# Patient Record
Sex: FEMALE | Race: WHITE | NOT HISPANIC OR LATINO | Employment: UNEMPLOYED | URBAN - METROPOLITAN AREA
[De-identification: names, ages, dates, MRNs, and addresses within clinical notes are randomized per-mention and may not be internally consistent; named-entity substitution may affect disease eponyms.]

---

## 2023-11-08 ENCOUNTER — TELEPHONE (OUTPATIENT)
Dept: PRIMARY CARE | Facility: CLINIC | Age: 53
End: 2023-11-08
Payer: MEDICARE

## 2023-11-08 DIAGNOSIS — E11.9 TYPE 2 DIABETES MELLITUS WITHOUT COMPLICATION, UNSPECIFIED WHETHER LONG TERM INSULIN USE (MULTI): ICD-10-CM

## 2023-11-08 RX ORDER — FAMOTIDINE 20 MG/1
20 TABLET, FILM COATED ORAL 2 TIMES DAILY
Qty: 180 TABLET | Refills: 0 | Status: SHIPPED | OUTPATIENT
Start: 2023-11-08 | End: 2024-01-30

## 2023-11-08 RX ORDER — FAMOTIDINE 20 MG/1
20 TABLET, FILM COATED ORAL 2 TIMES DAILY
COMMUNITY
End: 2023-11-08 | Stop reason: SDUPTHER

## 2023-11-10 PROBLEM — E87.1 HYPONATREMIA: Status: ACTIVE | Noted: 2022-06-07

## 2023-11-10 PROBLEM — F41.9 ANXIETY: Status: ACTIVE | Noted: 2023-11-10

## 2023-11-10 PROBLEM — M54.9 BACKACHE: Status: ACTIVE | Noted: 2023-11-10

## 2023-11-10 PROBLEM — D72.10 EOSINOPHILIA: Status: ACTIVE | Noted: 2022-06-07

## 2023-11-10 PROBLEM — R19.7 DIARRHEA: Status: ACTIVE | Noted: 2021-05-18

## 2023-11-10 PROBLEM — F10.11 HISTORY OF ALCOHOL ABUSE: Status: ACTIVE | Noted: 2023-11-10

## 2023-11-10 PROBLEM — E11.9 TYPE 2 DIABETES MELLITUS (MULTI): Status: ACTIVE | Noted: 2018-07-24

## 2023-11-10 PROBLEM — I10 BENIGN ESSENTIAL HYPERTENSION: Status: ACTIVE | Noted: 2023-11-10

## 2023-11-10 PROBLEM — S22.39XA CLOSED FRACTURE OF ONE RIB: Status: ACTIVE | Noted: 2023-11-10

## 2023-11-10 PROBLEM — M79.7 FIBROMYALGIA: Status: ACTIVE | Noted: 2023-11-10

## 2023-11-10 PROBLEM — L25.9: Status: ACTIVE | Noted: 2019-02-21
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
## 2023-11-10 PROBLEM — S72.009A FRACTURE OF NECK OF FEMUR (MULTI): Status: ACTIVE | Noted: 2023-11-10

## 2023-11-10 PROBLEM — J02.9 ACUTE PHARYNGITIS: Status: ACTIVE | Noted: 2019-04-01

## 2023-11-10 PROBLEM — S73.006A CLOSED TRAUMATIC DISLOCATION OF HIP (MULTI): Status: ACTIVE | Noted: 2023-11-10

## 2023-11-10 PROBLEM — M25.511 RIGHT SHOULDER PAIN: Status: ACTIVE | Noted: 2018-07-24

## 2023-11-10 PROBLEM — R22.2 SOFT TISSUE SWELLING OF CHEST WALL: Status: ACTIVE | Noted: 2022-04-28

## 2023-11-10 PROBLEM — R91.1 NODULE OF LEFT LUNG: Status: ACTIVE | Noted: 2021-05-18

## 2023-11-10 PROBLEM — R07.81 RIB PAIN ON RIGHT SIDE: Status: ACTIVE | Noted: 2019-02-06

## 2023-11-10 PROBLEM — K21.9 GASTROESOPHAGEAL REFLUX DISEASE: Status: ACTIVE | Noted: 2021-03-05

## 2023-11-10 PROBLEM — M54.6 THORACIC BACK PAIN: Status: ACTIVE | Noted: 2022-04-15

## 2023-11-10 PROBLEM — M25.50 ARTHRALGIA OF MULTIPLE SITES, BILATERAL: Status: ACTIVE | Noted: 2019-10-30

## 2023-11-10 PROBLEM — F10.20: Status: ACTIVE | Noted: 2022-04-19

## 2023-11-10 PROBLEM — E61.1 IRON DEFICIENCY: Status: ACTIVE | Noted: 2022-11-01

## 2023-11-10 PROBLEM — S22.49XA RIB FRACTURES: Status: ACTIVE | Noted: 2021-05-18

## 2023-11-10 PROBLEM — R23.3 SPONTANEOUS BRUISING: Status: ACTIVE | Noted: 2022-04-15

## 2023-11-10 PROBLEM — R93.89 ABNORMAL CHEST XRAY: Status: ACTIVE | Noted: 2022-06-27

## 2023-11-10 PROBLEM — I10 HYPERTENSION: Status: ACTIVE | Noted: 2018-07-24

## 2023-11-10 PROBLEM — M19.90 CHRONIC OSTEOARTHRITIS: Status: ACTIVE | Noted: 2023-11-10

## 2023-11-10 PROBLEM — A08.8: Status: ACTIVE | Noted: 2021-04-27

## 2023-11-10 PROBLEM — F31.81 BIPOLAR II DISORDER (MULTI): Status: ACTIVE | Noted: 2023-11-10

## 2023-11-10 PROBLEM — J94.2 HEMOTHORAX: Status: ACTIVE | Noted: 2022-06-07

## 2023-11-10 PROBLEM — R58 ECCHYMOSIS: Status: ACTIVE | Noted: 2023-11-10

## 2023-11-10 PROBLEM — E78.49 OTHER HYPERLIPIDEMIA: Status: ACTIVE | Noted: 2018-07-24

## 2023-11-10 PROBLEM — R07.89 CHEST WALL PAIN: Status: ACTIVE | Noted: 2023-11-10

## 2023-11-10 PROBLEM — R05.9 COUGH: Status: ACTIVE | Noted: 2019-02-06
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
## 2023-11-10 PROBLEM — D64.89: Status: ACTIVE | Noted: 2022-04-19

## 2023-11-10 PROBLEM — F32.A DEPRESSION: Status: ACTIVE | Noted: 2023-11-10

## 2023-11-10 PROBLEM — R10.33 PERIUMBILICAL ABDOMINAL PAIN: Status: ACTIVE | Noted: 2021-04-27

## 2023-11-10 PROBLEM — D50.9 IRON DEFICIENCY ANEMIA: Status: ACTIVE | Noted: 2022-04-20

## 2023-11-10 PROBLEM — J44.9 COPD MIXED TYPE (MULTI): Status: ACTIVE | Noted: 2022-07-12

## 2023-11-10 PROBLEM — F17.200 TOBACCO DEPENDENCE SYNDROME: Status: ACTIVE | Noted: 2023-11-10

## 2023-11-10 RX ORDER — GABAPENTIN 800 MG/1
800 TABLET ORAL 4 TIMES DAILY
COMMUNITY
Start: 2017-04-25 | End: 2023-12-06 | Stop reason: SDDI

## 2023-11-10 RX ORDER — ACETAMINOPHEN, DIPHENHYDRAMINE HCL, PHENYLEPHRINE HCL 325; 25; 5 MG/1; MG/1; MG/1
TABLET ORAL
COMMUNITY
Start: 2023-08-07

## 2023-11-10 RX ORDER — GLIMEPIRIDE 2 MG/1
TABLET ORAL
COMMUNITY
Start: 2017-04-25 | End: 2023-12-06 | Stop reason: SDDI

## 2023-11-10 RX ORDER — MIRTAZAPINE 30 MG/1
TABLET, FILM COATED ORAL
COMMUNITY
Start: 2015-02-11 | End: 2023-12-06 | Stop reason: SDDI

## 2023-11-10 RX ORDER — PRAVASTATIN SODIUM 20 MG/1
20 TABLET ORAL DAILY
COMMUNITY
End: 2023-12-06 | Stop reason: SDDI

## 2023-11-10 RX ORDER — MELOXICAM 15 MG/1
15 TABLET ORAL DAILY
COMMUNITY
Start: 2017-04-25 | End: 2023-12-06 | Stop reason: SDDI

## 2023-11-10 RX ORDER — ATENOLOL 25 MG/1
25 TABLET ORAL DAILY
COMMUNITY
End: 2023-12-06 | Stop reason: SDUPTHER

## 2023-11-10 RX ORDER — ASCORBIC ACID 500 MG
500 TABLET ORAL DAILY
COMMUNITY
End: 2023-12-06 | Stop reason: SDDI

## 2023-11-10 RX ORDER — OMEPRAZOLE 20 MG/1
CAPSULE, DELAYED RELEASE ORAL
COMMUNITY
Start: 2015-01-02 | End: 2023-12-06 | Stop reason: SDDI

## 2023-11-10 RX ORDER — VENLAFAXINE HYDROCHLORIDE 75 MG/1
CAPSULE, EXTENDED RELEASE ORAL
COMMUNITY

## 2023-11-10 RX ORDER — FERROUS SULFATE 325(65) MG
325 TABLET ORAL EVERY 24 HOURS
COMMUNITY

## 2023-11-10 RX ORDER — LORAZEPAM 1 MG/1
TABLET ORAL
COMMUNITY
Start: 2015-09-22 | End: 2023-12-06 | Stop reason: SDDI

## 2023-11-10 RX ORDER — ROSUVASTATIN CALCIUM 10 MG/1
TABLET, COATED ORAL
COMMUNITY
End: 2023-11-13 | Stop reason: WASHOUT

## 2023-11-10 RX ORDER — QUINAPRIL 20 MG/1
20 TABLET ORAL DAILY
COMMUNITY
Start: 2023-02-28 | End: 2023-12-06 | Stop reason: SDDI

## 2023-11-10 RX ORDER — QUINAPRIL HYDROCHLORIDE AND HYDROCHLOROTHIAZIDE 20; 12.5 MG/1; MG/1
TABLET, FILM COATED ORAL
COMMUNITY
Start: 2015-02-03 | End: 2023-12-06 | Stop reason: SDDI

## 2023-11-10 RX ORDER — RISPERIDONE 3 MG/1
3 TABLET ORAL NIGHTLY
COMMUNITY

## 2023-11-10 RX ORDER — LURASIDONE HYDROCHLORIDE 80 MG/1
TABLET, FILM COATED ORAL
COMMUNITY
Start: 2015-01-12 | End: 2023-12-06 | Stop reason: SDDI

## 2023-11-10 RX ORDER — TOPIRAMATE 50 MG/1
50 TABLET, FILM COATED ORAL 2 TIMES DAILY
COMMUNITY
Start: 2023-09-12

## 2023-11-10 RX ORDER — METFORMIN HYDROCHLORIDE 500 MG/1
TABLET ORAL
COMMUNITY
Start: 2017-04-25 | End: 2023-11-13 | Stop reason: WASHOUT

## 2023-11-10 RX ORDER — LANSOPRAZOLE 30 MG/1
30 CAPSULE, DELAYED RELEASE ORAL EVERY 24 HOURS
COMMUNITY
End: 2023-12-06 | Stop reason: SDDI

## 2023-11-10 RX ORDER — ESTRADIOL 1 MG/1
TABLET ORAL
COMMUNITY
Start: 2015-01-12 | End: 2023-12-06 | Stop reason: SDDI

## 2023-11-10 RX ORDER — TIZANIDINE 4 MG/1
1 TABLET ORAL 3 TIMES DAILY PRN
COMMUNITY
Start: 2017-04-25 | End: 2023-12-06 | Stop reason: SDDI

## 2023-11-13 ENCOUNTER — LAB (OUTPATIENT)
Dept: LAB | Facility: LAB | Age: 53
End: 2023-11-13
Payer: MEDICARE

## 2023-11-13 ENCOUNTER — OFFICE VISIT (OUTPATIENT)
Dept: PRIMARY CARE | Facility: CLINIC | Age: 53
End: 2023-11-13
Payer: MEDICARE

## 2023-11-13 VITALS
WEIGHT: 219 LBS | SYSTOLIC BLOOD PRESSURE: 124 MMHG | BODY MASS INDEX: 31.88 KG/M2 | DIASTOLIC BLOOD PRESSURE: 80 MMHG | HEART RATE: 90 BPM | OXYGEN SATURATION: 94 %

## 2023-11-13 DIAGNOSIS — E11.9 TYPE 2 DIABETES MELLITUS WITHOUT COMPLICATION, WITHOUT LONG-TERM CURRENT USE OF INSULIN (MULTI): Primary | ICD-10-CM

## 2023-11-13 DIAGNOSIS — Z23 NEEDS FLU SHOT: ICD-10-CM

## 2023-11-13 DIAGNOSIS — I10 BENIGN ESSENTIAL HYPERTENSION: ICD-10-CM

## 2023-11-13 DIAGNOSIS — F17.200 TOBACCO DEPENDENCE SYNDROME: ICD-10-CM

## 2023-11-13 DIAGNOSIS — F10.11 HISTORY OF ALCOHOL ABUSE: ICD-10-CM

## 2023-11-13 DIAGNOSIS — E11.9 TYPE 2 DIABETES MELLITUS WITHOUT COMPLICATION, UNSPECIFIED WHETHER LONG TERM INSULIN USE (MULTI): ICD-10-CM

## 2023-11-13 LAB
ALBUMIN SERPL-MCNC: 4.1 G/DL (ref 3.5–5)
ALP BLD-CCNC: 77 U/L (ref 35–125)
ALT SERPL-CCNC: 10 U/L (ref 5–40)
ANION GAP SERPL CALC-SCNC: 14 MMOL/L
APPEARANCE UR: CLEAR
AST SERPL-CCNC: 12 U/L (ref 5–40)
BILIRUB SERPL-MCNC: <0.2 MG/DL (ref 0.1–1.2)
BILIRUB UR STRIP.AUTO-MCNC: NEGATIVE MG/DL
BUN SERPL-MCNC: 15 MG/DL (ref 8–25)
CALCIUM SERPL-MCNC: 8.9 MG/DL (ref 8.5–10.4)
CHLORIDE SERPL-SCNC: 104 MMOL/L (ref 97–107)
CO2 SERPL-SCNC: 20 MMOL/L (ref 24–31)
COLOR UR: COLORLESS
CREAT SERPL-MCNC: 1.2 MG/DL (ref 0.4–1.6)
CREAT UR-MCNC: 66.8 MG/DL
EST. AVERAGE GLUCOSE BLD GHB EST-MCNC: 146 MG/DL
GFR SERPL CREATININE-BSD FRML MDRD: 54 ML/MIN/1.73M*2
GLUCOSE SERPL-MCNC: 241 MG/DL (ref 65–99)
GLUCOSE UR STRIP.AUTO-MCNC: NORMAL MG/DL
HBA1C MFR BLD: 6.7 %
KETONES UR STRIP.AUTO-MCNC: NEGATIVE MG/DL
LEUKOCYTE ESTERASE UR QL STRIP.AUTO: NEGATIVE
MICROALBUMIN UR-MCNC: <12 MG/L (ref 0–23)
MICROALBUMIN/CREAT UR: NORMAL MG/G{CREAT}
NITRITE UR QL STRIP.AUTO: NEGATIVE
PH UR STRIP.AUTO: 6.5 [PH]
POTASSIUM SERPL-SCNC: 4.3 MMOL/L (ref 3.4–5.1)
PROT SERPL-MCNC: 6.4 G/DL (ref 5.9–7.9)
PROT UR STRIP.AUTO-MCNC: NEGATIVE MG/DL
RBC # UR STRIP.AUTO: NEGATIVE /UL
SODIUM SERPL-SCNC: 138 MMOL/L (ref 133–145)
SP GR UR STRIP.AUTO: 1.01
UROBILINOGEN UR STRIP.AUTO-MCNC: NORMAL MG/DL

## 2023-11-13 PROCEDURE — 82043 UR ALBUMIN QUANTITATIVE: CPT

## 2023-11-13 PROCEDURE — 4010F ACE/ARB THERAPY RXD/TAKEN: CPT | Performed by: NURSE PRACTITIONER

## 2023-11-13 PROCEDURE — 3079F DIAST BP 80-89 MM HG: CPT | Performed by: NURSE PRACTITIONER

## 2023-11-13 PROCEDURE — 81003 URINALYSIS AUTO W/O SCOPE: CPT

## 2023-11-13 PROCEDURE — 99213 OFFICE O/P EST LOW 20 MIN: CPT | Performed by: NURSE PRACTITIONER

## 2023-11-13 PROCEDURE — 83036 HEMOGLOBIN GLYCOSYLATED A1C: CPT

## 2023-11-13 PROCEDURE — 36415 COLL VENOUS BLD VENIPUNCTURE: CPT

## 2023-11-13 PROCEDURE — 3074F SYST BP LT 130 MM HG: CPT | Performed by: NURSE PRACTITIONER

## 2023-11-13 PROCEDURE — 90686 IIV4 VACC NO PRSV 0.5 ML IM: CPT | Performed by: NURSE PRACTITIONER

## 2023-11-13 PROCEDURE — 4004F PT TOBACCO SCREEN RCVD TLK: CPT | Performed by: NURSE PRACTITIONER

## 2023-11-13 PROCEDURE — 80053 COMPREHEN METABOLIC PANEL: CPT

## 2023-11-13 PROCEDURE — G0008 ADMIN INFLUENZA VIRUS VAC: HCPCS | Performed by: NURSE PRACTITIONER

## 2023-11-13 PROCEDURE — 3044F HG A1C LEVEL LT 7.0%: CPT | Performed by: NURSE PRACTITIONER

## 2023-11-13 PROCEDURE — 82570 ASSAY OF URINE CREATININE: CPT

## 2023-11-13 NOTE — PROGRESS NOTES
Subjective   Patient ID: Lindsay Navarrete is a 53 y.o. female who presents for Diabetes.    HPI   Patient presents for interval visit  She denies any complaints today  She did not obtain blood work prior to exam  Eye doctor - Lasting greater than 2 years ago  Diet is poor. does not exercise  + ETOH use - pt states she is cutting back to only 2 nights per week  + smoker 3/4 ppd. Not ready to quit.  Dx with COPD - Sees pulmonology Dr. Evans who took her off oxygen approximately 2 months ago  Patient unsure what medication she is presently taking    Review of Systems  Constitutional Symptoms: Negative for fever, loss of appetite, headaches, fatigue.   Cardiovascular: Negative for chest pain/pressure, palpitations, edema  Respiratory: Negative for shortness of breath, dyspnea on exertion, pain with breathing, coughing.   Gastrointestinal: Negative for nausea, vomiting, abdominal pain, change in bowel habits  Musculoskeletal: Negative for joint pain, joint swelling, myalgias, cramps.   Integumentary: Negative for skin trouble or rash.   Neurological: Negative for headache, numbness, tingling, weakness, tremors.   Psychiatric: Negative for depression, anxiety.   Endocrine: Negative for weight gain, heat or cold intolerance, polyuria, polydipsia, polyphagia.   Hematologic/Lymphatic: Negative for bruising, abnormal bleeding, swollen glands.    Objective   There were no vitals taken for this visit.    Physical Exam  alert and oriented x3, NAD  Neck supple with no JVD  Lungs Diminished but clear  Heart with RRR with no edema.  Abd obese, soft NT/ND  skin warm and dry  Neuro grossly intact  Affect flat      Assessment/Plan   Diagnoses and all orders for this visit:  Type 2 diabetes mellitus without complication, without long-term current use of insulin (CMS/[facility])  -     Referral to Ophthalmology; Future  Needs flu shot  Tobacco dependence syndrome  Benign essential hypertension  History of alcohol abuse  Other orders  -      Flu vaccine (IIV4) age 6 months and greater, preservative free  Patient is to continue current plan of care  She is poor historian and does not know what medications she is presently taking  Advised to obtain her blood work as ordered  Healthy diet, activity  Flu shot given today  Educated on risks, benefits, side effects  Referred for eye exam.  Unable to do retina view in office due to insurance coverage  Await results for follow-up

## 2023-11-22 ENCOUNTER — TELEPHONE (OUTPATIENT)
Dept: PRIMARY CARE | Facility: CLINIC | Age: 53
End: 2023-11-22
Payer: MEDICARE

## 2023-11-22 NOTE — TELEPHONE ENCOUNTER
Pt says she is returning phone call from [facility]. Nurse was not available at the time.  Contact number- [phone]

## 2023-11-24 ENCOUNTER — TELEPHONE (OUTPATIENT)
Dept: PRIMARY CARE | Facility: CLINIC | Age: 53
End: 2023-11-24
Payer: MEDICARE

## 2023-11-28 ENCOUNTER — APPOINTMENT (OUTPATIENT)
Dept: PRIMARY CARE | Facility: CLINIC | Age: 53
End: 2023-11-28
Payer: MEDICARE

## 2023-12-06 ENCOUNTER — CLINICAL SUPPORT (OUTPATIENT)
Dept: PRIMARY CARE | Facility: CLINIC | Age: 53
End: 2023-12-06
Payer: MEDICARE

## 2023-12-06 DIAGNOSIS — I10 HYPERTENSION, UNSPECIFIED TYPE: ICD-10-CM

## 2023-12-06 DIAGNOSIS — E78.49 OTHER HYPERLIPIDEMIA: Primary | ICD-10-CM

## 2023-12-06 RX ORDER — ATENOLOL 25 MG/1
25 TABLET ORAL DAILY
Qty: 90 TABLET | Refills: 1 | Status: SHIPPED | OUTPATIENT
Start: 2023-12-06 | End: 2024-06-05

## 2023-12-06 RX ORDER — PRAVASTATIN SODIUM 20 MG/1
20 TABLET ORAL DAILY
Qty: 90 TABLET | Refills: 1 | Status: SHIPPED | OUTPATIENT
Start: 2023-12-06 | End: 2024-06-05

## 2023-12-06 NOTE — PROGRESS NOTES
MEDICATION MANAGEMENT    Lindsay Navarrete is a 53 y.o. female who was referred by LUCA Choi-CNP to complete medication reconciliation and review with the clinical pharmacist.  Mrs. Navarrete brings all of her current medications with her for OV today.      Patient has T2DM, well controlled with current A1C of 6.7%.  Currently not taking any medications for T2DM.  Intol to Meformin (diarrhea), financial barrier exists, would benefit from [facility] assistance.  Pt is smoker, strong odor of tobacco in exam room.      MEDICATION HISTORY  Allergies   Allergen Reactions    Metformin Diarrhea     Current Outpatient Medications on File Prior to Visit   Medication Sig Dispense Refill    atenolol (Tenormin) 25 mg tablet Take 1 tablet (25 mg) by mouth once daily.      famotidine (Pepcid) 20 mg tablet Take 1 tablet (20 mg) by mouth 2 times a day. 180 tablet 0    ferrous sulfate, 325 mg ferrous sulfate, tablet Take 1 tablet (325 mg) by mouth once every 24 hours.      melatonin 10 mg tablet TAKE 1 TABLET BY MOUTH AT BEDTIME DAILY      risperiDONE (RisperDAL) 3 mg tablet Take 1 tablet (3 mg) by mouth once daily at bedtime.      topiramate (Topamax) 50 mg tablet Take 1 tablet (50 mg) by mouth 2 times a day.      venlafaxine XR (Effexor-XR) 75 mg 24 hr capsule TAKE 1 CASPULE BY MOUTH ONCE DAILY WITH BREAKFAST      [DISCONTINUED] quinapril (Accupril) 20 mg tablet Take 1 tablet (20 mg) by mouth once daily.      [DISCONTINUED] ascorbic acid (Vitamin C) 500 mg tablet Take 1 tablet (500 mg) by mouth once daily.      [DISCONTINUED] estradiol (Estrace) 1 mg tablet Take by mouth.      [DISCONTINUED] gabapentin (Neurontin) 800 mg tablet Take 1 tablet (800 mg) by mouth 4 times a day.      [DISCONTINUED] glimepiride (Amaryl) 2 mg tablet Take by mouth.      [DISCONTINUED] lansoprazole (Prevacid) 30 mg DR capsule Take 1 capsule (30 mg) by mouth once every 24 hours.      [DISCONTINUED] levomilnacipran (Fetzima) 80 mg extended release  capsule Take by mouth.      [DISCONTINUED] LORazepam (Ativan) 1 mg tablet Take by mouth.      [DISCONTINUED] lurasidone (Latuda) 80 mg tablet Take by mouth.      [DISCONTINUED] meloxicam (Mobic) 15 mg tablet Take 1 tablet (15 mg) by mouth once daily.      [DISCONTINUED] mirtazapine (Remeron) 30 mg tablet Take by mouth.      [DISCONTINUED] omeprazole (PriLOSEC) 20 mg DR capsule Take by mouth.      [DISCONTINUED] pravastatin (Pravachol) 20 mg tablet Take 1 tablet (20 mg) by mouth once daily.      [DISCONTINUED] quinapriL-hydrochlorothiazide (Accuretic) 20-12.5 mg tablet Take by mouth.      [DISCONTINUED] tiZANidine (Zanaflex) 4 mg tablet Take 1 tablet (4 mg) by mouth 3 times a day as needed.       No current facility-administered medications on file prior to visit.       LABS  There were no vitals taken for this visit.   Lab Results   Component Value Date    HGBA1C 6.7 (H) 11/13/2023    HGBA1C 6.5 (H) 07/10/2023    HGBA1C 6.4 (H) 10/24/2022     Lab Results   Component Value Date    BILITOT <0.2 11/13/2023    CALCIUM 8.9 11/13/2023    CO2 20 (L) 11/13/2023     11/13/2023    CREATININE 1.20 11/13/2023    GLUCOSE 241 (H) 11/13/2023    ALKPHOS 77 11/13/2023    K 4.3 11/13/2023    PROT 6.4 11/13/2023     11/13/2023    AST 12 11/13/2023    ALT 10 11/13/2023    BUN 15 11/13/2023    ANIONGAP 14 11/13/2023    MG 1.4 (L) 06/14/2022    PHOS 4.1 07/05/2022    ALBUMIN 4.1 11/13/2023    LIPASE 42 04/15/2022     Lab Results   Component Value Date    TRIG 213 (H) 07/10/2023    CHOL 178 07/10/2023    LDLCALC 72 07/10/2023    HDL 63 07/10/2023         Assessment/Plan    Comments/Recommendations to PCP:  Reconciled medications with patient in office today.    Reviewed current labs.  Of note, A1c 6.7%, in target.  TRIG 213, currently taking pravastatin and tolerating  Addressed smoking cessation, pt is not interested at this time.    Pt qualifies for financial assistance via [employer] program.  Will bring 2022 tax return for  application.  Discussed starting DPP4 or weekly GLP-1 for improved glycemic control.  Pt prefers daily DPP4.  EGFR =54 making Tradjenta best choice.    Patient verbalizes understanding regarding plan of care and all questions answered   6.   Suggest follow up in 3 months    PLAN:   Bring copy of 2022 tax return to office or mail to office or email to Janeth  Once tax return is received I will order Tradjenta 5mg daily from [facility].    Refills ordered for atenolol and pravastatin today   4.  Follow up in 3 months with Janeth or Edda.    Janeth Melchor [facility]    Verbal consent to manage patient's drug therapy was obtained from the patient and/or an individual authorized to act on behalf of a patient. They were informed they may decline to participate or withdraw from participation in pharmacy services at any time.

## 2023-12-06 NOTE — PATIENT INSTRUCTIONS
Bring copy of 2022 tax return to office or mail to office or email to Janeth   Once tax return is received I will order Tradjenta 5mg daily from [facility].    Refills ordered for atenolol and pravastatin today

## 2024-03-18 ENCOUNTER — CLINICAL SUPPORT (OUTPATIENT)
Dept: PRIMARY CARE | Facility: CLINIC | Age: 54
End: 2024-03-18
Payer: MEDICARE

## 2024-03-18 DIAGNOSIS — E11.9 TYPE 2 DIABETES MELLITUS WITHOUT COMPLICATION, WITHOUT LONG-TERM CURRENT USE OF INSULIN (MULTI): Primary | ICD-10-CM

## 2024-03-18 LAB — POC HEMOGLOBIN A1C: 6.3 % (ref 4.2–6.5)

## 2024-03-18 PROCEDURE — 83036 HEMOGLOBIN GLYCOSYLATED A1C: CPT | Performed by: NURSE PRACTITIONER

## 2024-03-18 RX ORDER — LINAGLIPTIN 5 MG/1
5 TABLET, FILM COATED ORAL DAILY
Qty: 90 TABLET | Refills: 1 | Status: SHIPPED | OUTPATIENT
Start: 2024-03-18 | End: 2024-09-14

## 2024-03-18 NOTE — PROGRESS NOTES
Diabetes Management  E11.9    HPI  Lindsay Navarrete is a 53 y.o. female who presents for a follow-up evaluation of her Type 2 diabetes mellitus.     Referring Provider: DONALDO Choi     Patient has T2DM, well controlled with current A1C of 6.7%.  Currently not taking any medications for T2DM.  Intol to Meformin (diarrhea), financial barrier exists, would benefit from [facility] assistance.       CURRENT DM PHARMACOTHERAPY  Currently not taking any medications for T2DM    LAB REVIEW   Lab Results   Component Value Date    HGBA1C 6.7 (H) 11/13/2023    HGBA1C 6.5 (H) 07/10/2023    HGBA1C 6.4 (H) 10/24/2022     Lab Results   Component Value Date    CREATININE 1.20 11/13/2023    GLUCOSE 241 (H) 11/13/2023    EGFR 54 (L) 11/13/2023     Lab Results   Component Value Date    TRIG 213 (H) 07/10/2023    CHOL 178 07/10/2023    LDLCALC 72 07/10/2023    HDL 63 07/10/2023       HISTORICAL PHARMACOTHERAPY  Current Outpatient Medications on File Prior to Visit   Medication Sig Dispense Refill    atenolol (Tenormin) 25 mg tablet Take 1 tablet (25 mg) by mouth once daily. 90 tablet 1    famotidine (Pepcid) 20 mg tablet take 1 tablet by mouth twice a day 180 tablet 0    ferrous sulfate, 325 mg ferrous sulfate, tablet Take 1 tablet (325 mg) by mouth once every 24 hours.      melatonin 10 mg tablet TAKE 1 TABLET BY MOUTH AT BEDTIME DAILY      pravastatin (Pravachol) 20 mg tablet Take 1 tablet (20 mg) by mouth once daily. 90 tablet 1    risperiDONE (RisperDAL) 3 mg tablet Take 1 tablet (3 mg) by mouth once daily at bedtime.      topiramate (Topamax) 50 mg tablet Take 1 tablet (50 mg) by mouth 2 times a day.      venlafaxine XR (Effexor-XR) 75 mg 24 hr capsule TAKE 1 CASPULE BY MOUTH ONCE DAILY WITH BREAKFAST       No current facility-administered medications on file prior to visit.       SMBG  Not testing sugars at home    RECOMMENDATIONS/PLAN  Pt diabetes is improving  with most recent A1c of 6.3% (goal < 7 %).   A1c  decreased since last visit / 4 months  Previously discussed starting DPP4 if financial assistance is approved.  Pt brings all documents with her to appt today  2.   Discussed Tradjenta SE JOVANNA, Administration with patient in detail.        Plan:   Jaenth will apply for assistance from  assistance fund  Once approved, medication will be shipped to you from [facility] (their phone number is [phone]  START Tradjenta 5mg tablets, take one tablet daily     Treatment and plan discussed with YUDITH CHRISTIE [facility], JANETH JONES [facility], [facility]    Verbal consent to manage patient's drug therapy was obtained from the patient or an individual authorized to act on behalf of the patient.  Patient was informed they may decline to participate or withdraw from participation in pharmacy services at any time.

## 2024-03-18 NOTE — PATIENT INSTRUCTIONS
Janeth will apply for assistance from  assistance fund  Once approved, medication will be shipped to you from [facility] (their phone number is [phone]  START Tradjenta 5mg tablets, take one tablet daily   Current A1c 6.3% - keep up the good work!

## 2024-03-19 ENCOUNTER — TELEMEDICINE (OUTPATIENT)
Dept: PHARMACY | Facility: HOSPITAL | Age: 54
End: 2024-03-19
Payer: MEDICARE

## 2024-03-19 DIAGNOSIS — E11.9 TYPE 2 DIABETES MELLITUS WITHOUT COMPLICATION, WITHOUT LONG-TERM CURRENT USE OF INSULIN (MULTI): ICD-10-CM

## 2024-03-19 DIAGNOSIS — E11.9 TYPE 2 DIABETES MELLITUS WITHOUT COMPLICATION, WITHOUT LONG-TERM CURRENT USE OF INSULIN (MULTI): Primary | ICD-10-CM

## 2024-03-19 NOTE — PROGRESS NOTES
MEDICATION MANAGEMENT    Lindsay Navarrete is a 53 y.o. female who was referred by DONALDO Choi to complete medication reconciliation and review with the clinical pharmacist.  A comprehensive medication review was completed with the patient via telephone today.  Patient reports financial barrier with current medication.      MEDICATION HISTORY  Allergies   Allergen Reactions    Metformin Diarrhea     Current Outpatient Medications on File Prior to Visit   Medication Sig Dispense Refill    atenolol (Tenormin) 25 mg tablet Take 1 tablet (25 mg) by mouth once daily. 90 tablet 1    famotidine (Pepcid) 20 mg tablet take 1 tablet by mouth twice a day 180 tablet 0    ferrous sulfate, 325 mg ferrous sulfate, tablet Take 1 tablet (325 mg) by mouth once every 24 hours.      linaGLIPtin (Tradjenta) 5 mg tablet Take 1 tablet (5 mg) by mouth once daily. 90 tablet 1    melatonin 10 mg tablet TAKE 1 TABLET BY MOUTH AT BEDTIME DAILY      pravastatin (Pravachol) 20 mg tablet Take 1 tablet (20 mg) by mouth once daily. 90 tablet 1    risperiDONE (RisperDAL) 3 mg tablet Take 1 tablet (3 mg) by mouth once daily at bedtime.      topiramate (Topamax) 50 mg tablet Take 1 tablet (50 mg) by mouth 2 times a day.      venlafaxine XR (Effexor-XR) 75 mg 24 hr capsule TAKE 1 CASPULE BY MOUTH ONCE DAILY WITH BREAKFAST       No current facility-administered medications on file prior to visit.        Patient Assistance Screening (VAF)  Patient verbally reports monthly or yearly income which is less than 400% federal poverty level.  Application for program has been submitted for the following medications: Tradjenta  Patient has been informed that program team will be reaching out to them to discuss necessary documentation, instructed to answer phone/return voicemail.   Patient aware this process may take up to 6 weeks.   If approved medication must be filled through [facility] pharmacy and may be picked up or mailed to patient.        LABS  There were no vitals taken for this visit.   Lab Results   Component Value Date    HGBA1C 6.3 03/18/2024    HGBA1C 6.7 (H) 11/13/2023    HGBA1C 6.5 (H) 07/10/2023     Lab Results   Component Value Date    BILITOT <0.2 11/13/2023    CALCIUM 8.9 11/13/2023    CO2 20 (L) 11/13/2023     11/13/2023    CREATININE 1.20 11/13/2023    GLUCOSE 241 (H) 11/13/2023    ALKPHOS 77 11/13/2023    K 4.3 11/13/2023    PROT 6.4 11/13/2023     11/13/2023    AST 12 11/13/2023    ALT 10 11/13/2023    BUN 15 11/13/2023    ANIONGAP 14 11/13/2023    MG 1.4 (L) 06/14/2022    PHOS 4.1 07/05/2022    ALBUMIN 4.1 11/13/2023    LIPASE 42 04/15/2022     Lab Results   Component Value Date    TRIG 213 (H) 07/10/2023    CHOL 178 07/10/2023    LDLCALC 72 07/10/2023    HDL 63 07/10/2023         Assessment/Plan    Comments/Recommendations to PCP:  Reconciled medications with patient via telephone today.  Pt was seen recently in office to discuss addition of medication.  Tradjenta being best option due to CKD.  Reviewed instructions, MOA, SE and dose for Tradjenta   Patient verbalizes understanding regarding plan of care and all questions answered   4.   Pt will follow up with PCP as scheduled        Janeth Melchor [facility]    Verbal consent to manage patient's drug therapy was obtained from the patient and/or an individual authorized to act on behalf of a patient. They were informed they may decline to participate or withdraw from participation in pharmacy services at any time.

## 2024-03-19 NOTE — PROGRESS NOTES
Please review for internal [employer] (VAF) eligibility. Prescriptions have been sent to [facility].     Lindsay Navarrete  1970   [id number]  [phone]   [email or web address]  Delivery Preference (if known): MAIL  Priority:  Hold Medication until [facility] approved/denied  Filing Status: Patient does file taxes  Household size: 2  Financial Documents To Be Collected By: Documents attached to email  Medication(s):    Tradjenta    *I have confirmed the above medications are listed on the current VAF formulary: [email or web address]    I acknowledge the [facility] staff will further reach out to the patient to confirm additional details as needed, including but not limited to collecting financial documentation, confirming delivery information, obtaining payment method for non-VAF medications.      Janeth Melchor [facility]

## 2024-03-26 PROCEDURE — RXMED WILLOW AMBULATORY MEDICATION CHARGE

## 2024-03-29 ENCOUNTER — PHARMACY VISIT (OUTPATIENT)
Dept: PHARMACY | Facility: CLINIC | Age: 54
End: 2024-03-29
Payer: COMMERCIAL

## 2024-05-31 ENCOUNTER — CLINICAL SUPPORT (OUTPATIENT)
Dept: PRIMARY CARE | Facility: CLINIC | Age: 54
End: 2024-05-31
Payer: MEDICARE

## 2024-05-31 DIAGNOSIS — E11.9 TYPE 2 DIABETES MELLITUS WITHOUT COMPLICATION, WITHOUT LONG-TERM CURRENT USE OF INSULIN (MULTI): Primary | ICD-10-CM

## 2024-05-31 LAB — POC HEMOGLOBIN A1C: 6.3 % (ref 4.2–6.5)

## 2024-05-31 PROCEDURE — 83036 HEMOGLOBIN GLYCOSYLATED A1C: CPT | Performed by: NURSE PRACTITIONER

## 2024-05-31 NOTE — PROGRESS NOTES
Diabetes Management  E11.9    HPI  Lindsay Navarrete is a 53 y.o. female who presents for a follow-up evaluation of her Type 2 diabetes mellitus.     Referring Provider: Edda Murcia, APRN-CNP     CURRENT DM PHARMACOTHERAPY  Tradjenta 5mg daily     LAB REVIEW   Lab Results   Component Value Date    HGBA1C 6.3 05/31/2024    HGBA1C 6.3 03/18/2024    HGBA1C 6.7 (H) 11/13/2023     Lab Results   Component Value Date    CREATININE 1.20 11/13/2023    GLUCOSE 241 (H) 11/13/2023    EGFR 54 (L) 11/13/2023     Lab Results   Component Value Date    TRIG 213 (H) 07/10/2023    CHOL 178 07/10/2023    LDLCALC 72 07/10/2023    HDL 63 07/10/2023       HISTORICAL PHARMACOTHERAPY  Current Outpatient Medications on File Prior to Visit   Medication Sig Dispense Refill    atenolol (Tenormin) 25 mg tablet Take 1 tablet (25 mg) by mouth once daily. 90 tablet 1    famotidine (Pepcid) 20 mg tablet take 1 tablet by mouth twice a day 180 tablet 0    ferrous sulfate, 325 mg ferrous sulfate, tablet Take 1 tablet (325 mg) by mouth once every 24 hours.      linaGLIPtin (Tradjenta) 5 mg tablet Take 1 tablet (5 mg) by mouth once daily. 90 tablet 1    melatonin 10 mg tablet TAKE 1 TABLET BY MOUTH AT BEDTIME DAILY      pravastatin (Pravachol) 20 mg tablet Take 1 tablet (20 mg) by mouth once daily. 90 tablet 1    risperiDONE (RisperDAL) 3 mg tablet Take 1 tablet (3 mg) by mouth once daily at bedtime.      topiramate (Topamax) 50 mg tablet Take 1 tablet (50 mg) by mouth 2 times a day.      venlafaxine XR (Effexor-XR) 75 mg 24 hr capsule TAKE 1 CASPULE BY MOUTH ONCE DAILY WITH BREAKFAST       No current facility-administered medications on file prior to visit.       SMBG  Not checking sugars at home, declines CGM    RECOMMENDATIONS/PLAN  Pt diabetes is stable with most recent A1c of 6.3% (goal < 7 %).   A1c remains stable.  Positive reinforcement given for her efforts and progress!  2.   Continue Tradjenta.  Pt denies SE with medication.  Financial  assistance is in place via [facility]      Plan:   Continue Tradjenta as prescribed  Follow up with PCP in 3 months    Treatment and plan discussed with YUDITH CHRISTIE [facility], RANDI JONES [facility], [facility]    Verbal consent to manage patient's drug therapy was obtained from the patient or an individual authorized to act on behalf of the patient.  Patient was informed they may decline to participate or withdraw from participation in pharmacy services at any time.

## 2024-06-05 DIAGNOSIS — E78.49 OTHER HYPERLIPIDEMIA: ICD-10-CM

## 2024-06-05 DIAGNOSIS — I10 HYPERTENSION, UNSPECIFIED TYPE: ICD-10-CM

## 2024-06-05 RX ORDER — PRAVASTATIN SODIUM 20 MG/1
20 TABLET ORAL DAILY
Qty: 90 TABLET | Refills: 0 | Status: SHIPPED | OUTPATIENT
Start: 2024-06-05

## 2024-06-05 RX ORDER — ATENOLOL 25 MG/1
25 TABLET ORAL DAILY
Qty: 90 TABLET | Refills: 0 | Status: SHIPPED | OUTPATIENT
Start: 2024-06-05

## 2024-06-20 PROCEDURE — RXMED WILLOW AMBULATORY MEDICATION CHARGE

## 2024-06-25 ENCOUNTER — PHARMACY VISIT (OUTPATIENT)
Dept: PHARMACY | Facility: CLINIC | Age: 54
End: 2024-06-25
Payer: COMMERCIAL

## 2024-08-09 ENCOUNTER — TELEPHONE (OUTPATIENT)
Dept: PRIMARY CARE | Facility: CLINIC | Age: 54
End: 2024-08-09
Payer: MEDICARE

## 2024-08-09 DIAGNOSIS — Z00.00 ROUTINE GENERAL MEDICAL EXAMINATION AT A HEALTH CARE FACILITY: ICD-10-CM

## 2024-08-09 DIAGNOSIS — E78.49 OTHER HYPERLIPIDEMIA: ICD-10-CM

## 2024-08-09 DIAGNOSIS — I10 HYPERTENSION, UNSPECIFIED TYPE: ICD-10-CM

## 2024-08-09 DIAGNOSIS — J42 CHRONIC BRONCHITIS, UNSPECIFIED CHRONIC BRONCHITIS TYPE (MULTI): ICD-10-CM

## 2024-08-09 DIAGNOSIS — K21.9 GASTROESOPHAGEAL REFLUX DISEASE WITHOUT ESOPHAGITIS: ICD-10-CM

## 2024-08-09 DIAGNOSIS — E11.9 TYPE 2 DIABETES MELLITUS WITHOUT COMPLICATION, WITHOUT LONG-TERM CURRENT USE OF INSULIN (MULTI): ICD-10-CM

## 2024-08-09 RX ORDER — FAMOTIDINE 20 MG/1
20 TABLET, FILM COATED ORAL 2 TIMES DAILY
Qty: 180 TABLET | Refills: 0 | Status: SHIPPED | OUTPATIENT
Start: 2024-08-09 | End: 2024-08-09 | Stop reason: SDUPTHER

## 2024-08-09 RX ORDER — FAMOTIDINE 20 MG/1
20 TABLET, FILM COATED ORAL 2 TIMES DAILY
Qty: 180 TABLET | Refills: 0 | Status: SHIPPED | OUTPATIENT
Start: 2024-08-09

## 2024-08-20 PROBLEM — F12.10 CANNABIS ABUSE: Status: ACTIVE | Noted: 2021-05-28

## 2024-08-20 PROBLEM — F32.9 MAJOR DEPRESSIVE DISORDER, SINGLE EPISODE, UNSPECIFIED: Status: ACTIVE | Noted: 2021-05-28

## 2024-08-20 PROBLEM — F10.10 ALCOHOL USE DISORDER, MILD, ABUSE: Status: ACTIVE | Noted: 2021-09-01

## 2024-08-20 PROBLEM — F17.200 TOBACCO USE DISORDER: Status: ACTIVE | Noted: 2023-12-12

## 2024-08-29 ENCOUNTER — LAB (OUTPATIENT)
Dept: LAB | Facility: LAB | Age: 54
End: 2024-08-29
Payer: MEDICARE

## 2024-08-29 DIAGNOSIS — E11.9 TYPE 2 DIABETES MELLITUS WITHOUT COMPLICATION, WITHOUT LONG-TERM CURRENT USE OF INSULIN (MULTI): ICD-10-CM

## 2024-08-29 DIAGNOSIS — J42 CHRONIC BRONCHITIS, UNSPECIFIED CHRONIC BRONCHITIS TYPE (MULTI): ICD-10-CM

## 2024-08-29 DIAGNOSIS — Z00.00 ROUTINE GENERAL MEDICAL EXAMINATION AT A HEALTH CARE FACILITY: ICD-10-CM

## 2024-08-29 DIAGNOSIS — E78.49 OTHER HYPERLIPIDEMIA: ICD-10-CM

## 2024-08-29 LAB
ALBUMIN SERPL-MCNC: 4.1 G/DL (ref 3.5–5)
ALP BLD-CCNC: 63 U/L (ref 35–125)
ALT SERPL-CCNC: 7 U/L (ref 5–40)
ANION GAP SERPL CALC-SCNC: 10 MMOL/L
APPEARANCE UR: CLEAR
AST SERPL-CCNC: 13 U/L (ref 5–40)
BASOPHILS # BLD AUTO: 0.03 X10*3/UL (ref 0–0.1)
BASOPHILS NFR BLD AUTO: 0.5 %
BILIRUB SERPL-MCNC: 0.2 MG/DL (ref 0.1–1.2)
BILIRUB UR STRIP.AUTO-MCNC: NEGATIVE MG/DL
BUN SERPL-MCNC: 13 MG/DL (ref 8–25)
CALCIUM SERPL-MCNC: 9.3 MG/DL (ref 8.5–10.4)
CHLORIDE SERPL-SCNC: 106 MMOL/L (ref 97–107)
CHOLEST SERPL-MCNC: 171 MG/DL (ref 133–200)
CHOLEST/HDLC SERPL: 2.7 {RATIO}
CO2 SERPL-SCNC: 21 MMOL/L (ref 24–31)
COLOR UR: COLORLESS
CREAT SERPL-MCNC: 1.4 MG/DL (ref 0.4–1.6)
CREAT UR-MCNC: 16.8 MG/DL
EGFRCR SERPLBLD CKD-EPI 2021: 45 ML/MIN/1.73M*2
EOSINOPHIL # BLD AUTO: 0.14 X10*3/UL (ref 0–0.7)
EOSINOPHIL NFR BLD AUTO: 2.2 %
ERYTHROCYTE [DISTWIDTH] IN BLOOD BY AUTOMATED COUNT: 13.7 % (ref 11.5–14.5)
EST. AVERAGE GLUCOSE BLD GHB EST-MCNC: 111 MG/DL
GLUCOSE SERPL-MCNC: 109 MG/DL (ref 65–99)
GLUCOSE UR STRIP.AUTO-MCNC: NORMAL MG/DL
HBA1C MFR BLD: 5.5 %
HCT VFR BLD AUTO: 37.6 % (ref 36–46)
HDLC SERPL-MCNC: 64 MG/DL
HGB BLD-MCNC: 12.4 G/DL (ref 12–16)
IMM GRANULOCYTES # BLD AUTO: 0.01 X10*3/UL (ref 0–0.7)
IMM GRANULOCYTES NFR BLD AUTO: 0.2 % (ref 0–0.9)
KETONES UR STRIP.AUTO-MCNC: NEGATIVE MG/DL
LDLC SERPL CALC-MCNC: 80 MG/DL (ref 65–130)
LEUKOCYTE ESTERASE UR QL STRIP.AUTO: NEGATIVE
LYMPHOCYTES # BLD AUTO: 1.68 X10*3/UL (ref 1.2–4.8)
LYMPHOCYTES NFR BLD AUTO: 26.5 %
MCH RBC QN AUTO: 32 PG (ref 26–34)
MCHC RBC AUTO-ENTMCNC: 33 G/DL (ref 32–36)
MCV RBC AUTO: 97 FL (ref 80–100)
MICROALBUMIN UR-MCNC: <12 MG/L (ref 0–23)
MICROALBUMIN/CREAT UR: NORMAL MG/G{CREAT}
MONOCYTES # BLD AUTO: 0.55 X10*3/UL (ref 0.1–1)
MONOCYTES NFR BLD AUTO: 8.7 %
NEUTROPHILS # BLD AUTO: 3.93 X10*3/UL (ref 1.2–7.7)
NEUTROPHILS NFR BLD AUTO: 61.9 %
NITRITE UR QL STRIP.AUTO: NEGATIVE
NRBC BLD-RTO: 0 /100 WBCS (ref 0–0)
PH UR STRIP.AUTO: 6 [PH]
PLATELET # BLD AUTO: 194 X10*3/UL (ref 150–450)
POTASSIUM SERPL-SCNC: 3.9 MMOL/L (ref 3.4–5.1)
PROT SERPL-MCNC: 6.4 G/DL (ref 5.9–7.9)
PROT UR STRIP.AUTO-MCNC: NEGATIVE MG/DL
RBC # BLD AUTO: 3.88 X10*6/UL (ref 4–5.2)
RBC # UR STRIP.AUTO: NEGATIVE /UL
SODIUM SERPL-SCNC: 137 MMOL/L (ref 133–145)
SP GR UR STRIP.AUTO: 1
TRIGL SERPL-MCNC: 135 MG/DL (ref 40–150)
UROBILINOGEN UR STRIP.AUTO-MCNC: NORMAL MG/DL
WBC # BLD AUTO: 6.3 X10*3/UL (ref 4.4–11.3)

## 2024-08-29 PROCEDURE — 85025 COMPLETE CBC W/AUTO DIFF WBC: CPT

## 2024-08-29 PROCEDURE — 80053 COMPREHEN METABOLIC PANEL: CPT

## 2024-08-29 PROCEDURE — 82570 ASSAY OF URINE CREATININE: CPT

## 2024-08-29 PROCEDURE — 36415 COLL VENOUS BLD VENIPUNCTURE: CPT

## 2024-08-29 PROCEDURE — 83036 HEMOGLOBIN GLYCOSYLATED A1C: CPT

## 2024-08-29 PROCEDURE — 81003 URINALYSIS AUTO W/O SCOPE: CPT

## 2024-08-29 PROCEDURE — 82043 UR ALBUMIN QUANTITATIVE: CPT

## 2024-08-29 PROCEDURE — 80061 LIPID PANEL: CPT

## 2024-09-04 ENCOUNTER — OFFICE VISIT (OUTPATIENT)
Dept: PRIMARY CARE | Facility: CLINIC | Age: 54
End: 2024-09-04
Payer: MEDICARE

## 2024-09-04 ENCOUNTER — APPOINTMENT (OUTPATIENT)
Dept: PRIMARY CARE | Facility: CLINIC | Age: 54
End: 2024-09-04
Payer: MEDICARE

## 2024-09-04 ENCOUNTER — TELEPHONE (OUTPATIENT)
Dept: PRIMARY CARE | Facility: CLINIC | Age: 54
End: 2024-09-04

## 2024-09-04 VITALS
HEIGHT: 69 IN | TEMPERATURE: 97.7 F | OXYGEN SATURATION: 95 % | SYSTOLIC BLOOD PRESSURE: 122 MMHG | BODY MASS INDEX: 29.92 KG/M2 | HEART RATE: 62 BPM | WEIGHT: 202 LBS | DIASTOLIC BLOOD PRESSURE: 80 MMHG

## 2024-09-04 DIAGNOSIS — D64.89 ANEMIA DUE TO ALCOHOLISM (MULTI): ICD-10-CM

## 2024-09-04 DIAGNOSIS — E11.9 TYPE 2 DIABETES MELLITUS WITHOUT COMPLICATION, WITHOUT LONG-TERM CURRENT USE OF INSULIN (MULTI): ICD-10-CM

## 2024-09-04 DIAGNOSIS — N18.31 STAGE 3A CHRONIC KIDNEY DISEASE (MULTI): ICD-10-CM

## 2024-09-04 DIAGNOSIS — F31.81 BIPOLAR II DISORDER (MULTI): ICD-10-CM

## 2024-09-04 DIAGNOSIS — Z12.31 BREAST CANCER SCREENING BY MAMMOGRAM: ICD-10-CM

## 2024-09-04 DIAGNOSIS — F17.210 CIGARETTE SMOKER: ICD-10-CM

## 2024-09-04 DIAGNOSIS — E78.49 OTHER HYPERLIPIDEMIA: ICD-10-CM

## 2024-09-04 DIAGNOSIS — F10.20 ANEMIA DUE TO ALCOHOLISM (MULTI): ICD-10-CM

## 2024-09-04 DIAGNOSIS — Z00.00 WELL ADULT EXAM: Primary | ICD-10-CM

## 2024-09-04 DIAGNOSIS — J44.9 COPD MIXED TYPE (MULTI): ICD-10-CM

## 2024-09-04 PROBLEM — E66.3 OVERWEIGHT WITH BODY MASS INDEX (BMI) 25.0-29.9: Status: ACTIVE | Noted: 2024-09-04

## 2024-09-04 PROBLEM — N17.9 ACUTE RENAL FAILURE SYNDROME (CMS-HCC): Status: RESOLVED | Noted: 2024-09-04 | Resolved: 2024-09-04

## 2024-09-04 PROBLEM — E87.1 HYPO-OSMOLALITY AND HYPONATREMIA: Status: RESOLVED | Noted: 2024-09-04 | Resolved: 2024-09-04

## 2024-09-04 PROCEDURE — G0009 ADMIN PNEUMOCOCCAL VACCINE: HCPCS | Performed by: NURSE PRACTITIONER

## 2024-09-04 PROCEDURE — 3044F HG A1C LEVEL LT 7.0%: CPT | Performed by: NURSE PRACTITIONER

## 2024-09-04 PROCEDURE — 3048F LDL-C <100 MG/DL: CPT | Performed by: NURSE PRACTITIONER

## 2024-09-04 PROCEDURE — G0008 ADMIN INFLUENZA VIRUS VAC: HCPCS | Performed by: NURSE PRACTITIONER

## 2024-09-04 PROCEDURE — 3062F POS MACROALBUMINURIA REV: CPT | Performed by: NURSE PRACTITIONER

## 2024-09-04 PROCEDURE — 90677 PCV20 VACCINE IM: CPT | Performed by: NURSE PRACTITIONER

## 2024-09-04 PROCEDURE — 3079F DIAST BP 80-89 MM HG: CPT | Performed by: NURSE PRACTITIONER

## 2024-09-04 PROCEDURE — 3008F BODY MASS INDEX DOCD: CPT | Performed by: NURSE PRACTITIONER

## 2024-09-04 PROCEDURE — G0439 PPPS, SUBSEQ VISIT: HCPCS | Performed by: NURSE PRACTITIONER

## 2024-09-04 PROCEDURE — 3074F SYST BP LT 130 MM HG: CPT | Performed by: NURSE PRACTITIONER

## 2024-09-04 PROCEDURE — 99213 OFFICE O/P EST LOW 20 MIN: CPT | Performed by: NURSE PRACTITIONER

## 2024-09-04 PROCEDURE — 90656 IIV3 VACC NO PRSV 0.5 ML IM: CPT | Performed by: NURSE PRACTITIONER

## 2024-09-04 ASSESSMENT — PATIENT HEALTH QUESTIONNAIRE - PHQ9
2. FEELING DOWN, DEPRESSED OR HOPELESS: NOT AT ALL
SUM OF ALL RESPONSES TO PHQ9 QUESTIONS 1 AND 2: 0
1. LITTLE INTEREST OR PLEASURE IN DOING THINGS: NOT AT ALL

## 2024-09-04 ASSESSMENT — PAIN SCALES - GENERAL: PAINLEVEL: 0-NO PAIN

## 2024-09-04 NOTE — PROGRESS NOTES
Subjective   Patient ID: Lindsay Navarrete is a 54 y.o. female who presents for Annual Exam.    HPI   Lindsay is a 53 yo F who presents for CPE  PMH/SMH/FMH reviewed and updated   Diet is varied  No exercise  + smoking 1ppd x 30+year, not ready to quit  Drinking alcohol, has cut back    Immunizations reviewed  Agrees to flu and pneumovax    Colonoscopy 9/21 with 3 year follow up  Dr Woodson    GYN - total Hyster 15-20 year ago  Mammogram - overdue    Sees [facility] - see med list  Sees therapist and psychiatrist  Overall doing well    Review of Systems  Constitutional Symptoms: negative for fever, loss of appetite, headaches, fatigue.   Eyes: negative for loss and blurring of vision, double vision.   Ear, Nose, Mouth, Throat: negative for hearing loss, tinnitus, nasal congestion, rhinorrhea, nose bleeds, teeth problems, mouth sores, gum disease, dysphagia, sore throat.   Cardiovascular: negative for chest pain/pressure, palpitations, edema, claudication.   Respiratory: negative for shortness of breath, dyspnea on exertion, pain with breathing, coughing.   Breast: negative for tenderness, masses, gynecomastia.   Gastrointestinal: negative for anorexia, indigestion, nausea, vomiting, abdominal pain, change in bowel habits, diarrhea, constipation, hematochezia, melena, blood in stool.    : Negative for urinary or genital complaints  Musculoskeletal: negative for joint pain, joint swelling, myalgia, cramps.   Integumentary: negative for change in mole, skin trouble or rash.   Neurological: negative for headache, numbness, tingling, weakness, tremors.   Psychiatric: negative for depression, anxiety.   Endocrine: negative for weight gain, heat or cold intolerance, polyuria, polydipsia, polyphagia.   Hematologic/Lymphatic: negative for bruising, abnormal bleeding, swollen glands     Objective   /80 (BP Location: Left arm, Patient Position: Sitting)   Pulse 62   Temp 36.5 °C (97.7 °F) (Temporal)   Ht  "1.753 m (5' 9\")   Wt 91.6 kg (202 lb)   SpO2 95%   BMI 29.83 kg/m²     Physical Exam  General Appearance: Comfortable. She is well nourished, and well developed. She is awake, alert, and oriented and appears her stated age. The patient is cooperative with exam.  Head: Hair pattern reveals a normal pattern for patient's age and The face shows no abnormalities.  Eyes: PERRLA, EOMI, conjunctiva and sclera clear. Extraocular muscle exam reveals EOMI.  Ears, Nose, Mouth, Throat: Bilateral canals are normal. Both tympanic membranes are pearly gray and landmarks normal.    Nasal mucosa in both nostrils reveals no polyps, ulcerations, or lesions. Oral mucosa reveals no abnormalities and Teeth reveal good repair.  Neck: Neck reveals supple, no adenopathy, no thyromegaly, or carotid bruits.  Chest: Lungs are clear to auscultation bilaterally with no wheezes, rales, or rhonchi.  Cardiovascular: RRR without MRG. No edema  Breast:  Bilateral breasts are symmetrical without masses or discharge.  Abdomen: Abdomen with normoactive bowel sounds x4 quads, Abd is soft, NT, ND with no masses.  Genitourinary: deferred  Musculoskeletal: 5/5 and equal strength in bilateral upper and lower extremities.  Skin: Skin reveals good turgor and no rashes.  Neurological: Intact and non-focal. Cranial nerves II - XII are grossly intact.  Psychiatric: Patient has appropriate judgement. Patient has good insight. Patient's mood/affect flat     Assessment/Plan   Diagnoses and all orders for this visit:  Well adult exam  55 yo F well exam  Preventative screenings reviewed  Immunizations reviewed  Mediterranean diet, exercise, safety  Follow up 6 months  Cigarette smoker  -     CT lung screening low dose; Future  Smoking cessation encouraged  Not ready to quit  Breast cancer screening by mammogram  -     BI mammo bilateral screening tomosynthesis; Future  Stage 3a chronic kidney disease (Multi)  GFR 45 with creatinine 1.4  Continue plan of care  Avoid " NSAIDs  Stay on tradjenta  Type 2 diabetes mellitus without complication, without long-term current use of insulin (Multi)  Glucose 109 with A1c 5.5  Continue Tradjenta 5 mg daily  Healthy ADA diet, exercise  Follow-up 6 months  Bipolar II disorder (Multi)  [facility] care with [facility]  COPD mixed type (Multi)  Patient denies any concerns.  Refuses inhalers  Smoking cessation encouraged  Anemia due to alcoholism (Multi)  By history.  Encouraged alcohol cessation, patient states she is cut back substantially  Blood work stable  Other orders  -     Pneumococcal conjugate vaccine, 20-valent (PREVNAR 20)  -     Flu vaccine, trivalent, preservative free, age 6 months and greater (Fluraix/Fluzone/Flulaval)  Indications, benefits and risks/SE discussed    -     Follow Up In Primary Care - Established; Future

## 2024-09-04 NOTE — PATIENT INSTRUCTIONS
Thank you for choosing our office for your healthcare needs.     Follow the Mediterranean diet - choosing whole foods, vegetables, lean proteins.   It is recommended to get 150 minutes of exercise per week - you can do this in short intervals or 30 minutes 5x per week.     Please call  [facility] (Kel Avina Khatami and Kandice) for your repeat colonoscopy.   The scheduling number for this practice is [phone].

## 2024-09-16 ENCOUNTER — TELEPHONE (OUTPATIENT)
Dept: PRIMARY CARE | Facility: CLINIC | Age: 54
End: 2024-09-16
Payer: MEDICARE

## 2024-09-16 DIAGNOSIS — E78.49 OTHER HYPERLIPIDEMIA: ICD-10-CM

## 2024-09-16 DIAGNOSIS — I10 HYPERTENSION, UNSPECIFIED TYPE: ICD-10-CM

## 2024-09-16 PROCEDURE — RXMED WILLOW AMBULATORY MEDICATION CHARGE

## 2024-09-16 RX ORDER — ATENOLOL 25 MG/1
25 TABLET ORAL DAILY
Qty: 90 TABLET | Refills: 1 | Status: SHIPPED | OUTPATIENT
Start: 2024-09-16

## 2024-09-16 RX ORDER — PRAVASTATIN SODIUM 20 MG/1
20 TABLET ORAL DAILY
Qty: 90 TABLET | Refills: 1 | Status: SHIPPED | OUTPATIENT
Start: 2024-09-16

## 2024-09-17 ENCOUNTER — PHARMACY VISIT (OUTPATIENT)
Dept: PHARMACY | Facility: CLINIC | Age: 54
End: 2024-09-17
Payer: COMMERCIAL

## 2024-09-18 DIAGNOSIS — E11.9 TYPE 2 DIABETES MELLITUS WITHOUT COMPLICATION, WITHOUT LONG-TERM CURRENT USE OF INSULIN (MULTI): ICD-10-CM

## 2024-09-18 PROCEDURE — RXMED WILLOW AMBULATORY MEDICATION CHARGE

## 2024-09-18 RX ORDER — LINAGLIPTIN 5 MG/1
5 TABLET, FILM COATED ORAL DAILY
Qty: 90 TABLET | Refills: 1 | Status: SHIPPED | OUTPATIENT
Start: 2024-09-18 | End: 2025-03-17

## 2024-09-19 ENCOUNTER — PHARMACY VISIT (OUTPATIENT)
Dept: PHARMACY | Facility: CLINIC | Age: 54
End: 2024-09-19
Payer: COMMERCIAL

## 2024-11-08 ENCOUNTER — TELEPHONE (OUTPATIENT)
Dept: PRIMARY CARE | Facility: CLINIC | Age: 54
End: 2024-11-08
Payer: MEDICARE

## 2024-11-08 NOTE — TELEPHONE ENCOUNTER
Rx Refill Request Telephone Encounter    Name:  Lindsay SNOW Landon  :  [id number]  Medication Name:  Famotidine, she is out, told her KGB is out            Specific Pharmacy location:   [facility]  Date of last appointment:    Date of next appointment:    Best number to reach patient:

## 2024-12-17 PROCEDURE — RXMED WILLOW AMBULATORY MEDICATION CHARGE

## 2024-12-18 ENCOUNTER — TELEPHONE (OUTPATIENT)
Dept: PRIMARY CARE | Facility: CLINIC | Age: 54
End: 2024-12-18
Payer: MEDICARE

## 2024-12-18 DIAGNOSIS — E78.49 OTHER HYPERLIPIDEMIA: ICD-10-CM

## 2024-12-18 DIAGNOSIS — I10 HYPERTENSION, UNSPECIFIED TYPE: ICD-10-CM

## 2024-12-18 DIAGNOSIS — K21.9 GASTROESOPHAGEAL REFLUX DISEASE WITHOUT ESOPHAGITIS: ICD-10-CM

## 2024-12-18 RX ORDER — PRAVASTATIN SODIUM 20 MG/1
20 TABLET ORAL DAILY
Qty: 90 TABLET | Refills: 1 | Status: SHIPPED | OUTPATIENT
Start: 2024-12-18

## 2024-12-18 RX ORDER — FAMOTIDINE 20 MG/1
20 TABLET, FILM COATED ORAL 2 TIMES DAILY
Qty: 180 TABLET | Refills: 0 | Status: SHIPPED | OUTPATIENT
Start: 2024-12-18

## 2024-12-18 RX ORDER — ATENOLOL 25 MG/1
25 TABLET ORAL DAILY
Qty: 90 TABLET | Refills: 1 | Status: SHIPPED | OUTPATIENT
Start: 2024-12-18

## 2024-12-19 ENCOUNTER — PHARMACY VISIT (OUTPATIENT)
Dept: PHARMACY | Facility: CLINIC | Age: 54
End: 2024-12-19
Payer: COMMERCIAL

## 2025-02-02 DIAGNOSIS — E11.9 TYPE 2 DIABETES MELLITUS WITHOUT COMPLICATION, WITHOUT LONG-TERM CURRENT USE OF INSULIN (MULTI): Primary | ICD-10-CM

## 2025-02-18 ENCOUNTER — APPOINTMENT (OUTPATIENT)
Dept: PHARMACY | Facility: HOSPITAL | Age: 55
End: 2025-02-18
Payer: MEDICARE

## 2025-02-18 DIAGNOSIS — E11.9 TYPE 2 DIABETES MELLITUS WITHOUT COMPLICATION, WITHOUT LONG-TERM CURRENT USE OF INSULIN (MULTI): ICD-10-CM

## 2025-02-18 NOTE — PROGRESS NOTES
MEDICATION MANAGEMENT    Lindsay Navarrete is a 54 y.o. female who was referred by Jaclyn Romero DO to complete medication reconciliation and review with the clinical pharmacist.  A comprehensive medication review was completed with the patient via telephone today.  With patient's permission, this is a Telemedicine visit with audio. Provider located at office address. Patient located at their home address. All issues as documented below were discussed and addressed but limited physical exam was performed. If it was felt that the patient should be evaluated via face-to-face office appointment(s) they were directed to appropriate location.  Patient reports financial barrier with current medication.      MEDICATION HISTORY  Allergies   Allergen Reactions    Metformin Diarrhea    Sertraline Other     Reducing sodium levels     Current Outpatient Medications on File Prior to Visit   Medication Sig Dispense Refill    atenolol (Tenormin) 25 mg tablet Take 1 tablet (25 mg) by mouth once daily. 90 tablet 1    famotidine (Pepcid) 20 mg tablet Take 1 tablet (20 mg) by mouth 2 times a day. 180 tablet 0    ferrous sulfate, 325 mg ferrous sulfate, tablet Take 1 tablet (325 mg) by mouth once every 24 hours.      linaGLIPtin (Tradjenta) 5 mg tablet Take 1 tablet (5 mg) by mouth once daily. 90 tablet 1    melatonin 10 mg tablet TAKE 1 TABLET BY MOUTH AT BEDTIME DAILY      pravastatin (Pravachol) 20 mg tablet Take 1 tablet (20 mg) by mouth once daily. 90 tablet 1    risperiDONE (RisperDAL) 3 mg tablet Take 1 tablet (3 mg) by mouth once daily at bedtime.      topiramate (Topamax) 50 mg tablet Take 1 tablet (50 mg) by mouth 2 times a day.      venlafaxine XR (Effexor-XR) 75 mg 24 hr capsule TAKE 1 CASPULE BY MOUTH ONCE DAILY WITH BREAKFAST       No current facility-administered medications on file prior to visit.        Patient Assistance Screening (VAF)  Patient verbally reports monthly or yearly income which is less than 400%  federal poverty level.  Application for program has been submitted for the following medications:     Tradjenta     Patient has been informed that program team will be reaching out to them to discuss necessary documentation, instructed to answer phone/return voicemail.   Patient aware this process may take up to 6 weeks.   If approved medication [facility] and may be picked up or mailed to patient.       LABS  There were no vitals taken for this visit.   Lab Results   Component Value Date    HGBA1C 5.5 08/29/2024    HGBA1C 6.3 05/31/2024    HGBA1C 6.3 03/18/2024     Lab Results   Component Value Date    BILITOT 0.2 08/29/2024    CALCIUM 9.3 08/29/2024    CO2 21 (L) 08/29/2024     08/29/2024    CREATININE 1.40 08/29/2024    GLUCOSE 109 (H) 08/29/2024    ALKPHOS 63 08/29/2024    K 3.9 08/29/2024    PROT 6.4 08/29/2024     08/29/2024    AST 13 08/29/2024    ALT 7 08/29/2024    BUN 13 08/29/2024    ANIONGAP 10 08/29/2024    MG 1.4 (L) 06/14/2022    PHOS 4.1 07/05/2022    ALBUMIN 4.1 08/29/2024    LIPASE 42 04/15/2022     Lab Results   Component Value Date    TRIG 135 08/29/2024    CHOL 171 08/29/2024    LDLCALC 80 08/29/2024    HDL 64.0 08/29/2024         Assessment/Plan    Comments/Recommendations to PCP:  Reconciled medications with patient via telephone today.  Pt has been well managed on Tradjenta with improved A1c.  Was 6.7% one year ago and is now 5.5%.    Reviewed instructions, MOA, SE and dose for Tradjenta.     Patient verbalizes understanding regarding plan of care and all questions answered   4.   Pt will follow up with PCP as scheduled          Janeth Melchor [facility] JOSEPH    Verbal consent to manage patient's drug therapy was obtained from the patient and/or an individual authorized to act on behalf of a patient. They were informed they may decline to participate or withdraw from participation in pharmacy services at any time.

## 2025-02-18 NOTE — PROGRESS NOTES
Please review for internal [employer] (VAF) eligibility. Prescriptions have been sent to [facility].     Lindsay Navarrete  1970   [id number]  [phone]   [email or web address]  Delivery Preference (if known): MAIL  Priority:  Hold Medication until [facility] approved/denied  Filing Status: Patient does file taxes  Household size: 2  Financial Documents To Be Collected By: Documents attached to email  Medication(s):    Tradjenta    *I have confirmed the above medications are listed on the current VAF formulary: [email or web address]    I acknowledge the [facility] staff will further reach out to the patient to confirm additional details as needed, including but not limited to collecting financial documentation, confirming delivery information, obtaining payment method for non-VAF medications.      Janeth Melchor [facility]

## 2025-03-04 ENCOUNTER — APPOINTMENT (OUTPATIENT)
Dept: PRIMARY CARE | Facility: CLINIC | Age: 55
End: 2025-03-04
Payer: MEDICARE

## 2025-03-15 DIAGNOSIS — E11.9 TYPE 2 DIABETES MELLITUS WITHOUT COMPLICATION, WITHOUT LONG-TERM CURRENT USE OF INSULIN (MULTI): ICD-10-CM

## 2025-03-17 DIAGNOSIS — E11.9 TYPE 2 DIABETES MELLITUS WITHOUT COMPLICATION, WITHOUT LONG-TERM CURRENT USE OF INSULIN (MULTI): ICD-10-CM

## 2025-03-17 PROCEDURE — RXMED WILLOW AMBULATORY MEDICATION CHARGE

## 2025-03-17 RX ORDER — LINAGLIPTIN 5 MG/1
5 TABLET, FILM COATED ORAL DAILY
Qty: 90 TABLET | Refills: 1 | Status: SHIPPED | OUTPATIENT
Start: 2025-03-17 | End: 2025-09-13

## 2025-03-17 RX ORDER — LINAGLIPTIN 5 MG/1
5 TABLET, FILM COATED ORAL DAILY
Qty: 90 TABLET | Refills: 0 | Status: SHIPPED | OUTPATIENT
Start: 2025-03-17 | End: 2025-03-17 | Stop reason: SDUPTHER

## 2025-03-20 ENCOUNTER — PHARMACY VISIT (OUTPATIENT)
Dept: PHARMACY | Facility: CLINIC | Age: 55
End: 2025-03-20
Payer: COMMERCIAL

## 2025-03-22 LAB
ALBUMIN SERPL-MCNC: 4.3 G/DL (ref 3.6–5.1)
ALBUMIN/CREAT UR: NORMAL
ALP SERPL-CCNC: 55 U/L (ref 37–153)
ALT SERPL-CCNC: 9 U/L (ref 6–29)
ANION GAP SERPL CALCULATED.4IONS-SCNC: 10 MMOL/L (CALC) (ref 7–17)
APPEARANCE UR: CLEAR
AST SERPL-CCNC: 13 U/L (ref 10–35)
BILIRUB SERPL-MCNC: 0.3 MG/DL (ref 0.2–1.2)
BILIRUB UR QL STRIP: NEGATIVE
BUN SERPL-MCNC: 16 MG/DL (ref 7–25)
CALCIUM SERPL-MCNC: 9.4 MG/DL (ref 8.6–10.4)
CHLORIDE SERPL-SCNC: 103 MMOL/L (ref 98–110)
CHOLEST SERPL-MCNC: 168 MG/DL
CHOLEST/HDLC SERPL: 2.8 (CALC)
CO2 SERPL-SCNC: 24 MMOL/L (ref 20–32)
COLOR UR: YELLOW
CREAT SERPL-MCNC: 1.35 MG/DL (ref 0.5–1.03)
CREAT UR-MCNC: NORMAL MG/DL
EGFRCR SERPLBLD CKD-EPI 2021: 46 ML/MIN/1.73M2
EST. AVERAGE GLUCOSE BLD GHB EST-MCNC: 128 MG/DL
EST. AVERAGE GLUCOSE BLD GHB EST-SCNC: 7.1 MMOL/L
GLUCOSE SERPL-MCNC: 101 MG/DL (ref 65–99)
GLUCOSE UR QL STRIP: NEGATIVE
HBA1C MFR BLD: 6.1 % OF TOTAL HGB
HDLC SERPL-MCNC: 60 MG/DL
HGB UR QL STRIP: NEGATIVE
KETONES UR QL STRIP: NEGATIVE
LDLC SERPL CALC-MCNC: 83 MG/DL (CALC)
LEUKOCYTE ESTERASE UR QL STRIP: NEGATIVE
MICROALBUMIN UR-MCNC: NORMAL
NITRITE UR QL STRIP: NEGATIVE
NONHDLC SERPL-MCNC: 108 MG/DL (CALC)
PH UR STRIP: 6 [PH] (ref 5–8)
POTASSIUM SERPL-SCNC: 3.8 MMOL/L (ref 3.5–5.3)
PROT SERPL-MCNC: 6.9 G/DL (ref 6.1–8.1)
PROT UR QL STRIP: NEGATIVE
SODIUM SERPL-SCNC: 137 MMOL/L (ref 135–146)
SP GR UR STRIP: 1 (ref 1–1.03)
TRIGL SERPL-MCNC: 153 MG/DL

## 2025-03-25 ENCOUNTER — TELEPHONE (OUTPATIENT)
Dept: PRIMARY CARE | Facility: CLINIC | Age: 55
End: 2025-03-25

## 2025-03-25 ENCOUNTER — APPOINTMENT (OUTPATIENT)
Dept: PRIMARY CARE | Facility: CLINIC | Age: 55
End: 2025-03-25
Payer: MEDICARE

## 2025-03-25 VITALS
BODY MASS INDEX: 28.73 KG/M2 | HEART RATE: 66 BPM | TEMPERATURE: 97.7 F | WEIGHT: 194 LBS | OXYGEN SATURATION: 95 % | HEIGHT: 69 IN | SYSTOLIC BLOOD PRESSURE: 110 MMHG | DIASTOLIC BLOOD PRESSURE: 74 MMHG

## 2025-03-25 DIAGNOSIS — B35.1 ONYCHOMYCOSIS OF TOENAIL: ICD-10-CM

## 2025-03-25 DIAGNOSIS — E11.9 TYPE 2 DIABETES MELLITUS WITHOUT COMPLICATION, WITHOUT LONG-TERM CURRENT USE OF INSULIN: Primary | ICD-10-CM

## 2025-03-25 DIAGNOSIS — I10 BENIGN ESSENTIAL HYPERTENSION: ICD-10-CM

## 2025-03-25 DIAGNOSIS — E78.1 HYPERTRIGLYCERIDEMIA: ICD-10-CM

## 2025-03-25 DIAGNOSIS — E11.9 TYPE 2 DIABETES MELLITUS WITHOUT COMPLICATION, WITHOUT LONG-TERM CURRENT USE OF INSULIN: ICD-10-CM

## 2025-03-25 DIAGNOSIS — N18.31 STAGE 3A CHRONIC KIDNEY DISEASE (MULTI): ICD-10-CM

## 2025-03-25 DIAGNOSIS — N18.31 STAGE 3A CHRONIC KIDNEY DISEASE (MULTI): Primary | ICD-10-CM

## 2025-03-25 DIAGNOSIS — E78.49 OTHER HYPERLIPIDEMIA: ICD-10-CM

## 2025-03-25 PROBLEM — E87.1 HYPONATREMIA: Status: RESOLVED | Noted: 2022-06-07 | Resolved: 2025-03-25

## 2025-03-25 PROBLEM — R19.7 DIARRHEA: Status: RESOLVED | Noted: 2021-05-18 | Resolved: 2025-03-25

## 2025-03-25 PROBLEM — R22.2 SOFT TISSUE SWELLING OF CHEST WALL: Status: RESOLVED | Noted: 2022-04-28 | Resolved: 2025-03-25

## 2025-03-25 PROBLEM — A08.8: Status: RESOLVED | Noted: 2021-04-27 | Resolved: 2025-03-25

## 2025-03-25 PROBLEM — J02.9 ACUTE PHARYNGITIS: Status: RESOLVED | Noted: 2019-04-01 | Resolved: 2025-03-25

## 2025-03-25 PROBLEM — J94.2 HEMOTHORAX: Status: RESOLVED | Noted: 2022-06-07 | Resolved: 2025-03-25

## 2025-03-25 PROBLEM — R10.33 PERIUMBILICAL ABDOMINAL PAIN: Status: RESOLVED | Noted: 2021-04-27 | Resolved: 2025-03-25

## 2025-03-25 PROBLEM — D64.89 ANEMIA DUE TO ALCOHOLISM: Status: RESOLVED | Noted: 2022-04-19 | Resolved: 2025-03-25

## 2025-03-25 PROBLEM — R07.81 RIB PAIN ON RIGHT SIDE: Status: RESOLVED | Noted: 2019-02-06 | Resolved: 2025-03-25

## 2025-03-25 PROBLEM — F10.20 ANEMIA DUE TO ALCOHOLISM: Status: RESOLVED | Noted: 2022-04-19 | Resolved: 2025-03-25

## 2025-03-25 PROBLEM — R23.3 SPONTANEOUS BRUISING: Status: RESOLVED | Noted: 2022-04-15 | Resolved: 2025-03-25

## 2025-03-25 PROBLEM — L25.9: Status: RESOLVED | Noted: 2019-02-21 | Resolved: 2025-03-25

## 2025-03-25 PROBLEM — D50.9 IRON DEFICIENCY ANEMIA: Status: RESOLVED | Noted: 2022-04-20 | Resolved: 2025-03-25

## 2025-03-25 LAB
ALBUMIN SERPL-MCNC: 4.3 G/DL (ref 3.6–5.1)
ALBUMIN/CREAT UR: ABNORMAL MG/G CREAT
ALP SERPL-CCNC: 55 U/L (ref 37–153)
ALT SERPL-CCNC: 9 U/L (ref 6–29)
ANION GAP SERPL CALCULATED.4IONS-SCNC: 10 MMOL/L (CALC) (ref 7–17)
APPEARANCE UR: CLEAR
AST SERPL-CCNC: 13 U/L (ref 10–35)
BILIRUB SERPL-MCNC: 0.3 MG/DL (ref 0.2–1.2)
BILIRUB UR QL STRIP: NEGATIVE
BUN SERPL-MCNC: 16 MG/DL (ref 7–25)
CALCIUM SERPL-MCNC: 9.4 MG/DL (ref 8.6–10.4)
CHLORIDE SERPL-SCNC: 103 MMOL/L (ref 98–110)
CHOLEST SERPL-MCNC: 168 MG/DL
CHOLEST/HDLC SERPL: 2.8 (CALC)
CO2 SERPL-SCNC: 24 MMOL/L (ref 20–32)
COLOR UR: YELLOW
CREAT SERPL-MCNC: 1.35 MG/DL (ref 0.5–1.03)
CREAT UR-MCNC: 17 MG/DL (ref 20–275)
EGFRCR SERPLBLD CKD-EPI 2021: 46 ML/MIN/1.73M2
EST. AVERAGE GLUCOSE BLD GHB EST-MCNC: 128 MG/DL
EST. AVERAGE GLUCOSE BLD GHB EST-SCNC: 7.1 MMOL/L
GLUCOSE SERPL-MCNC: 101 MG/DL (ref 65–99)
GLUCOSE UR QL STRIP: NEGATIVE
HBA1C MFR BLD: 6.1 % OF TOTAL HGB
HDLC SERPL-MCNC: 60 MG/DL
HGB UR QL STRIP: NEGATIVE
KETONES UR QL STRIP: NEGATIVE
LDLC SERPL CALC-MCNC: 83 MG/DL (CALC)
LEUKOCYTE ESTERASE UR QL STRIP: NEGATIVE
MICROALBUMIN UR-MCNC: <0.2 MG/DL
NITRITE UR QL STRIP: NEGATIVE
NONHDLC SERPL-MCNC: 108 MG/DL (CALC)
PH UR STRIP: 6 [PH] (ref 5–8)
POTASSIUM SERPL-SCNC: 3.8 MMOL/L (ref 3.5–5.3)
PROT SERPL-MCNC: 6.9 G/DL (ref 6.1–8.1)
PROT UR QL STRIP: NEGATIVE
SODIUM SERPL-SCNC: 137 MMOL/L (ref 135–146)
SP GR UR STRIP: 1 (ref 1–1.03)
TRIGL SERPL-MCNC: 153 MG/DL

## 2025-03-25 RX ORDER — PHENOL 1.4 %
1 AEROSOL, SPRAY (ML) MUCOUS MEMBRANE NIGHTLY
COMMUNITY
Start: 2025-03-06

## 2025-03-25 RX ORDER — TRAZODONE HYDROCHLORIDE 50 MG/1
1 TABLET ORAL NIGHTLY PRN
COMMUNITY
Start: 2025-03-06

## 2025-03-25 ASSESSMENT — ENCOUNTER SYMPTOMS
CARDIOVASCULAR NEGATIVE: 1
RESPIRATORY NEGATIVE: 1

## 2025-03-25 ASSESSMENT — COLUMBIA-SUICIDE SEVERITY RATING SCALE - C-SSRS
2. HAVE YOU ACTUALLY HAD ANY THOUGHTS OF KILLING YOURSELF?: NO
6. HAVE YOU EVER DONE ANYTHING, STARTED TO DO ANYTHING, OR PREPARED TO DO ANYTHING TO END YOUR LIFE?: NO
1. IN THE PAST MONTH, HAVE YOU WISHED YOU WERE DEAD OR WISHED YOU COULD GO TO SLEEP AND NOT WAKE UP?: NO

## 2025-03-25 ASSESSMENT — PATIENT HEALTH QUESTIONNAIRE - PHQ9
1. LITTLE INTEREST OR PLEASURE IN DOING THINGS: NOT AT ALL
SUM OF ALL RESPONSES TO PHQ9 QUESTIONS 1 AND 2: 0
2. FEELING DOWN, DEPRESSED OR HOPELESS: NOT AT ALL

## 2025-03-25 NOTE — PROGRESS NOTES
"Subjective   Patient ID: Lindsay Navarrete is a 54 y.o. female who presents for Diabetes (HTN).    The patient is transferring her care from [place] who has left the practice.  1) DM type 2: controlled, HbA1C is 6.1%, compliant with Tradjenta, DM eye exam completed today and was normal, DM foot exam UTD (completed at last CPE), follows a low carb diet, walks a few days a week for exercise.    2) HTN: controlled, compliant with atenolol, follows a low salt diet, exercises as stated above.    3) Hypertriglyceridemia: uncontrolled, compliant with pravastatin, follows a low fat diet, exercises as stated above.    4) Stage 3a CKD: stable, only drinks diet pop, will try to drink water, does not take OTC NSAIDs.    5) Onychomycosis: 3 toenails, patient declines podiatry referral.    Review of Systems   Respiratory: Negative.     Cardiovascular: Negative.      Objective   /74 (BP Location: Left arm, Patient Position: Sitting, BP Cuff Size: Adult)   Pulse 66   Temp 36.5 °C (97.7 °F) (Temporal)   Ht 1.753 m (5' 9\")   Wt 88 kg (194 lb)   SpO2 95%   BMI 28.65 kg/m²     Physical Exam  Vitals and nursing note reviewed.   Constitutional:       General: She is not in acute distress.     Appearance: Normal appearance. She is not ill-appearing.   Cardiovascular:      Rate and Rhythm: Normal rate and regular rhythm.      Heart sounds: Normal heart sounds.   Pulmonary:      Effort: Pulmonary effort is normal.      Breath sounds: Normal breath sounds.   Feet:      Right foot:      Skin integrity: Callus (great toe) present.      Toenail Condition: Fungal disease present.     Left foot:      Skin integrity: Skin integrity normal.      Toenail Condition: Fungal disease present.  Neurological:      Mental Status: She is alert.     Assessment/Plan   Diagnoses and all orders for this visit:  Type 2 diabetes mellitus without complication, without long-term current use of insulin (Multi)  Controlled  HbA1C 6.1%.  Continue with " Tradjenta.  Diabetic Retinopathy Luminetics completed today: normal.  DM foot exam UTD.  Low carb diet.  Regular exercise.  Manage weight.  Follow up in September for CPE.  Benign essential hypertension  Controlled  Continue with atenolol.  Low salt diet.  Regular exercise.  Manage weight.  Follow up in September for CPE.  Hypertriglyceridemia  Uncontrolled  Continue with pravastatin.  Low fat diet.  Regular exercise.  Manage weight.  Follow up in September for CPE.  Stage 3a chronic kidney disease (Multi)  Stable  Adequate hydration with water.  Follow up in September for CPE.    Onychomycosis of toenail  Chronic  Patient declines podiatry referral.  Other orders  -     Follow Up In Primary Care - Established  -     Follow Up In Primary Care - Health Maintenance; Future

## 2025-04-28 PROBLEM — E78.00 PURE HYPERCHOLESTEROLEMIA: Status: ACTIVE | Noted: 2025-04-28

## 2025-04-28 PROBLEM — E78.1 HYPERTRIGLYCERIDEMIA: Status: RESOLVED | Noted: 2018-07-24 | Resolved: 2025-04-28

## 2025-05-05 ENCOUNTER — TELEPHONE (OUTPATIENT)
Dept: PRIMARY CARE | Facility: CLINIC | Age: 55
End: 2025-05-05
Payer: MEDICARE

## 2025-05-05 DIAGNOSIS — K21.9 GASTROESOPHAGEAL REFLUX DISEASE WITHOUT ESOPHAGITIS: ICD-10-CM

## 2025-05-05 RX ORDER — FAMOTIDINE 20 MG/1
20 TABLET, FILM COATED ORAL 2 TIMES DAILY
Qty: 180 TABLET | Refills: 0 | Status: SHIPPED | OUTPATIENT
Start: 2025-05-05

## 2025-06-10 ENCOUNTER — TELEPHONE (OUTPATIENT)
Dept: PRIMARY CARE | Facility: CLINIC | Age: 55
End: 2025-06-10
Payer: MEDICARE

## 2025-06-10 DIAGNOSIS — E78.49 OTHER HYPERLIPIDEMIA: ICD-10-CM

## 2025-06-10 DIAGNOSIS — I10 HYPERTENSION, UNSPECIFIED TYPE: ICD-10-CM

## 2025-06-10 NOTE — TELEPHONE ENCOUNTER
Refill for    Atenolol 25 MG    Pravastatin 20 MG    Pharmacy is [facility]    Patient can be reached at [phone]

## 2025-06-11 PROCEDURE — RXMED WILLOW AMBULATORY MEDICATION CHARGE

## 2025-06-11 RX ORDER — ATENOLOL 25 MG/1
25 TABLET ORAL DAILY
Qty: 90 TABLET | Refills: 0 | Status: SHIPPED | OUTPATIENT
Start: 2025-06-11 | End: 2025-09-09

## 2025-06-11 RX ORDER — PRAVASTATIN SODIUM 20 MG/1
20 TABLET ORAL DAILY
Qty: 90 TABLET | Refills: 1 | Status: SHIPPED | OUTPATIENT
Start: 2025-06-11

## 2025-06-12 ENCOUNTER — PHARMACY VISIT (OUTPATIENT)
Dept: PHARMACY | Facility: CLINIC | Age: 55
End: 2025-06-12
Payer: COMMERCIAL

## 2025-08-02 DIAGNOSIS — K21.9 GASTROESOPHAGEAL REFLUX DISEASE WITHOUT ESOPHAGITIS: ICD-10-CM

## 2025-08-04 RX ORDER — FAMOTIDINE 20 MG/1
20 TABLET, FILM COATED ORAL 2 TIMES DAILY
Qty: 180 TABLET | Refills: 0 | Status: SHIPPED | OUTPATIENT
Start: 2025-08-04

## 2025-08-19 ENCOUNTER — PATIENT OUTREACH (OUTPATIENT)
Dept: CARE COORDINATION | Facility: CLINIC | Age: 55
End: 2025-08-19
Payer: MEDICARE

## 2025-08-19 DIAGNOSIS — Z12.31 ENCOUNTER FOR SCREENING MAMMOGRAM FOR BREAST CANCER: ICD-10-CM

## 2025-09-25 ENCOUNTER — APPOINTMENT (OUTPATIENT)
Dept: PRIMARY CARE | Facility: CLINIC | Age: 55
End: 2025-09-25
Payer: MEDICARE